# Patient Record
Sex: FEMALE | Race: WHITE | Employment: UNEMPLOYED | URBAN - METROPOLITAN AREA
[De-identification: names, ages, dates, MRNs, and addresses within clinical notes are randomized per-mention and may not be internally consistent; named-entity substitution may affect disease eponyms.]

---

## 2018-04-05 ENCOUNTER — TELEPHONE (OUTPATIENT)
Dept: OBGYN CLINIC | Facility: CLINIC | Age: 33
End: 2018-04-05

## 2018-04-05 NOTE — TELEPHONE ENCOUNTER
Unable to leave message(busy signal) if pt calls back please schedule a [facility] ED VISIT, also please get ins info. If any questions please tranf call to RN.

## 2018-04-05 NOTE — TELEPHONE ENCOUNTER
Pt was advised [facility] has reviewed her records for transfer and we will be able to accept her for care. [facility] Education appt  scheduled. Pt agrees with plan. Call transferred to [facility] to complete registration.

## 2018-04-11 ENCOUNTER — NURSE ONLY (OUTPATIENT)
Dept: OBGYN CLINIC | Facility: CLINIC | Age: 33
End: 2018-04-11

## 2018-04-11 VITALS — HEIGHT: 65.5 IN | WEIGHT: 156.38 LBS | BODY MASS INDEX: 25.74 KG/M2

## 2018-04-11 DIAGNOSIS — Z34.81 PRENATAL CARE, SUBSEQUENT PREGNANCY IN FIRST TRIMESTER: Primary | ICD-10-CM

## 2018-04-11 PROBLEM — R82.71 GROUP B STREPTOCOCCAL BACTERIURIA: Status: ACTIVE | Noted: 2018-04-11

## 2018-04-11 PROCEDURE — 99211 OFF/OP EST MAY X REQ PHY/QHP: CPT | Performed by: ADVANCED PRACTICE MIDWIFE

## 2018-04-11 RX ORDER — MULTIVIT-MIN/IRON/FOLIC ACID/K 18-600-40
2000 CAPSULE ORAL
COMMUNITY
End: 2019-01-31 | Stop reason: ALTCHOICE

## 2018-04-11 RX ORDER — CHOLECALCIFEROL (VITAMIN D3) 25 MCG
1 TABLET,CHEWABLE ORAL DAILY
COMMUNITY
End: 2019-01-31 | Stop reason: ALTCHOICE

## 2018-04-11 NOTE — PROGRESS NOTES
Nurse education complete & information given to pt. Pt has 2 cats at home (does not clean litterbox). Lab work from previous provider reviewed, entered & verified. Toxo titers & HCV ordered. Unsure if desires FTS. Will speak w/ .  Order placed & phone

## 2018-04-23 ENCOUNTER — INITIAL PRENATAL (OUTPATIENT)
Dept: OBGYN CLINIC | Facility: CLINIC | Age: 33
End: 2018-04-23

## 2018-04-23 VITALS
WEIGHT: 155 LBS | DIASTOLIC BLOOD PRESSURE: 74 MMHG | BODY MASS INDEX: 25.52 KG/M2 | HEIGHT: 65.5 IN | HEART RATE: 71 BPM | SYSTOLIC BLOOD PRESSURE: 119 MMHG

## 2018-04-23 DIAGNOSIS — Z34.81 PRENATAL CARE, SUBSEQUENT PREGNANCY, FIRST TRIMESTER: Primary | ICD-10-CM

## 2018-04-23 PROCEDURE — 81002 URINALYSIS NONAUTO W/O SCOPE: CPT | Performed by: ADVANCED PRACTICE MIDWIFE

## 2018-04-23 PROCEDURE — 99212 OFFICE O/P EST SF 10 MIN: CPT | Performed by: ADVANCED PRACTICE MIDWIFE

## 2018-04-23 NOTE — PROGRESS NOTES
Patient early ANAM.  2  with epidural, desires NCB and water birth. Normal PE today. Denies complaints. Reviewed warning signs and CNM contact info.

## 2018-05-21 ENCOUNTER — ROUTINE PRENATAL (OUTPATIENT)
Dept: OBGYN CLINIC | Facility: CLINIC | Age: 33
End: 2018-05-21

## 2018-05-21 VITALS
BODY MASS INDEX: 26 KG/M2 | WEIGHT: 159 LBS | SYSTOLIC BLOOD PRESSURE: 103 MMHG | DIASTOLIC BLOOD PRESSURE: 69 MMHG | HEART RATE: 78 BPM

## 2018-05-21 DIAGNOSIS — Z34.82 ENCOUNTER FOR SUPERVISION OF OTHER NORMAL PREGNANCY IN SECOND TRIMESTER: Primary | ICD-10-CM

## 2018-05-21 PROCEDURE — 99212 OFFICE O/P EST SF 10 MIN: CPT | Performed by: ADVANCED PRACTICE MIDWIFE

## 2018-05-21 PROCEDURE — 81002 URINALYSIS NONAUTO W/O SCOPE: CPT | Performed by: ADVANCED PRACTICE MIDWIFE

## 2018-05-21 NOTE — PROGRESS NOTES
Denies any complaints. Feeling well. +FM. Labs reviewed with pt. Pt to start probiotics.   All questions answered  Warning signs reviewed

## 2018-06-18 ENCOUNTER — ROUTINE PRENATAL (OUTPATIENT)
Dept: OBGYN CLINIC | Facility: CLINIC | Age: 33
End: 2018-06-18

## 2018-06-18 VITALS
HEART RATE: 68 BPM | SYSTOLIC BLOOD PRESSURE: 119 MMHG | DIASTOLIC BLOOD PRESSURE: 71 MMHG | WEIGHT: 160.81 LBS | BODY MASS INDEX: 26 KG/M2

## 2018-06-18 DIAGNOSIS — Z34.82 ENCOUNTER FOR SUPERVISION OF OTHER NORMAL PREGNANCY IN SECOND TRIMESTER: Primary | ICD-10-CM

## 2018-06-18 PROBLEM — Z87.59 HISTORY OF DELIVERY OF MACROSOMAL INFANT: Status: ACTIVE | Noted: 2018-06-18

## 2018-06-18 PROCEDURE — 99212 OFFICE O/P EST SF 10 MIN: CPT | Performed by: ADVANCED PRACTICE MIDWIFE

## 2018-06-18 PROCEDURE — 81002 URINALYSIS NONAUTO W/O SCOPE: CPT | Performed by: ADVANCED PRACTICE MIDWIFE

## 2018-06-18 NOTE — PROGRESS NOTES
Feeling well overall, reports some pelvic discomfort when lifting left leg that has improved with stretching and prenatal yoga. Reviewed other comfort tips. Active baby, denies cramping or bleeding. Level I u/s ordered with [facility].  Hx 9 lb infant, measurin

## 2018-06-21 ENCOUNTER — HOSPITAL ENCOUNTER (OUTPATIENT)
Dept: PERINATAL CARE | Facility: HOSPITAL | Age: 33
Discharge: HOME | End: 2018-06-21
Attending: OBSTETRICS & GYNECOLOGY
Payer: COMMERCIAL

## 2018-06-21 ENCOUNTER — HOSPITAL ENCOUNTER (OUTPATIENT)
Dept: PERINATAL CARE | Facility: HOSPITAL | Age: 33
Discharge: HOME | End: 2018-06-21
Attending: ADVANCED PRACTICE MIDWIFE
Payer: COMMERCIAL

## 2018-06-21 DIAGNOSIS — Z36.3 ENCOUNTER FOR ANTENATAL SCREENING FOR MALFORMATION: ICD-10-CM

## 2018-06-21 DIAGNOSIS — Z36.3 ENCOUNTER FOR ANTENATAL SCREENING FOR MALFORMATION: Primary | ICD-10-CM

## 2018-06-21 PROCEDURE — 76805 OB US >/= 14 WKS SNGL FETUS: CPT | Performed by: OBSTETRICS & GYNECOLOGY

## 2018-06-21 NOTE — PROGRESS NOTES
OB ULTRASOUND REPORT    Encounter for a level I ultrasound at the request of Ms. Hopper.     See imaging tab for complete ultrasound report or in PACS    Fetal Heart Rate: Present 145 bpm  Fetal Presentation: Vertex  Amniotic fluid MVP: WNL  Cord: 3 vesse

## 2018-06-25 ENCOUNTER — TELEPHONE (OUTPATIENT)
Dept: OBGYN CLINIC | Facility: CLINIC | Age: 33
End: 2018-06-25

## 2018-06-25 NOTE — TELEPHONE ENCOUNTER
Pt was advised per [facility] that her OB US showed normal anatomy and consistent with her EDC. Midwives will further discuss the results with her at her next visit. Pt agrees with plan.

## 2020-04-02 PROBLEM — E28.2 PCOS (POLYCYSTIC OVARIAN SYNDROME): Status: ACTIVE | Noted: 2019-06-12

## 2020-04-02 PROBLEM — D25.9 UTERINE LEIOMYOMA: Status: ACTIVE | Noted: 2019-06-12

## 2020-04-02 PROBLEM — E03.8 SUBCLINICAL HYPOTHYROIDISM: Status: ACTIVE | Noted: 2020-04-02

## 2023-04-27 ENCOUNTER — OFFICE VISIT (OUTPATIENT)
Dept: OBGYN CLINIC | Facility: CLINIC | Age: 38
End: 2023-04-27

## 2023-04-27 VITALS
BODY MASS INDEX: 24.49 KG/M2 | HEIGHT: 65 IN | WEIGHT: 147 LBS | HEART RATE: 85 BPM | DIASTOLIC BLOOD PRESSURE: 74 MMHG | SYSTOLIC BLOOD PRESSURE: 113 MMHG

## 2023-04-27 DIAGNOSIS — Z12.72 SMEAR, VAGINAL, AS PART OF ROUTINE GYNECOLOGICAL EXAMINATION: ICD-10-CM

## 2023-04-27 DIAGNOSIS — Z01.419 ENCOUNTER FOR ANNUAL ROUTINE GYNECOLOGICAL EXAMINATION: Primary | ICD-10-CM

## 2023-04-27 DIAGNOSIS — Z01.419 SMEAR, VAGINAL, AS PART OF ROUTINE GYNECOLOGICAL EXAMINATION: ICD-10-CM

## 2023-04-27 DIAGNOSIS — L70.9 ACNE, UNSPECIFIED ACNE TYPE: ICD-10-CM

## 2023-04-27 DIAGNOSIS — M53.3 COCCYX PAIN: ICD-10-CM

## 2023-04-27 DIAGNOSIS — R53.83 FATIGUE, UNSPECIFIED TYPE: ICD-10-CM

## 2023-04-27 DIAGNOSIS — L68.0 HIRSUTISM: ICD-10-CM

## 2023-04-27 DIAGNOSIS — R10.2 PELVIC PAIN: ICD-10-CM

## 2023-04-27 PROBLEM — R82.71 GROUP B STREPTOCOCCAL BACTERIURIA: Status: RESOLVED | Noted: 2018-04-11 | Resolved: 2023-04-27

## 2023-04-27 PROCEDURE — 3008F BODY MASS INDEX DOCD: CPT | Performed by: ADVANCED PRACTICE MIDWIFE

## 2023-04-27 PROCEDURE — 3074F SYST BP LT 130 MM HG: CPT | Performed by: ADVANCED PRACTICE MIDWIFE

## 2023-04-27 PROCEDURE — 3078F DIAST BP <80 MM HG: CPT | Performed by: ADVANCED PRACTICE MIDWIFE

## 2023-04-27 PROCEDURE — 99385 PREV VISIT NEW AGE 18-39: CPT | Performed by: ADVANCED PRACTICE MIDWIFE

## 2023-04-28 LAB — HPV I/H RISK 1 DNA SPEC QL NAA+PROBE: NEGATIVE

## 2023-06-13 ENCOUNTER — HOSPITAL ENCOUNTER (OUTPATIENT)
Dept: ULTRASOUND IMAGING | Facility: HOSPITAL | Age: 38
Discharge: HOME | End: 2023-06-13
Attending: ADVANCED PRACTICE MIDWIFE
Payer: COMMERCIAL

## 2023-06-13 DIAGNOSIS — R10.2 PELVIC PAIN: ICD-10-CM

## 2023-06-13 PROCEDURE — 76830 TRANSVAGINAL US NON-OB: CPT | Performed by: ADVANCED PRACTICE MIDWIFE

## 2023-06-13 PROCEDURE — 93975 VASCULAR STUDY: CPT | Performed by: ADVANCED PRACTICE MIDWIFE

## 2023-06-13 PROCEDURE — 76856 US EXAM PELVIC COMPLETE: CPT | Performed by: ADVANCED PRACTICE MIDWIFE

## 2023-06-19 ENCOUNTER — TELEPHONE (OUTPATIENT)
Dept: OBGYN CLINIC | Facility: CLINIC | Age: 38
End: 2023-06-19

## 2023-06-19 NOTE — TELEPHONE ENCOUNTER
Phone call to patient to review ultrasound results. Informed her that IUD is not positioned correctly. Would recommend removal and reinsertion. Instructed her to schedule and take 600mg ibuprofen 30 minutes prior to visit. She reports that she is also seeing a counselor through [facility] and is very happy with them. She has been working on self esteem and one of the things that really affects her is her acne and facial hair growth. She plans to complete testosterone testing to see if PCOS is causing these symptoms. Discussed options for management, including birth control pills or seeing a derm for further management. She will complete testing and schedule with derm.     Answered all patient questions

## 2023-06-28 ENCOUNTER — TELEPHONE (OUTPATIENT)
Dept: OBGYN CLINIC | Facility: CLINIC | Age: 38
End: 2023-06-28

## 2023-06-29 ENCOUNTER — OFFICE VISIT (OUTPATIENT)
Dept: OBGYN CLINIC | Facility: CLINIC | Age: 38
End: 2023-06-29

## 2023-06-29 VITALS
SYSTOLIC BLOOD PRESSURE: 92 MMHG | HEART RATE: 68 BPM | WEIGHT: 144 LBS | HEIGHT: 65 IN | DIASTOLIC BLOOD PRESSURE: 58 MMHG | BODY MASS INDEX: 23.99 KG/M2

## 2023-06-29 DIAGNOSIS — Z30.433 ENCOUNTER FOR IUD REMOVAL AND REINSERTION: ICD-10-CM

## 2023-06-29 DIAGNOSIS — Z11.3 SCREEN FOR STD (SEXUALLY TRANSMITTED DISEASE): ICD-10-CM

## 2023-06-29 DIAGNOSIS — Z32.00 PREGNANCY EXAMINATION OR TEST, PREGNANCY UNCONFIRMED: Primary | ICD-10-CM

## 2023-06-29 LAB
CONTROL LINE PRESENT WITH A CLEAR BACKGROUND (YES/NO): YES YES/NO
PREGNANCY TEST, URINE: NEGATIVE

## 2023-06-29 PROCEDURE — 3008F BODY MASS INDEX DOCD: CPT | Performed by: ADVANCED PRACTICE MIDWIFE

## 2023-06-29 PROCEDURE — 81025 URINE PREGNANCY TEST: CPT | Performed by: ADVANCED PRACTICE MIDWIFE

## 2023-06-29 PROCEDURE — 3074F SYST BP LT 130 MM HG: CPT | Performed by: ADVANCED PRACTICE MIDWIFE

## 2023-06-29 PROCEDURE — 58300 INSERT INTRAUTERINE DEVICE: CPT | Performed by: ADVANCED PRACTICE MIDWIFE

## 2023-06-29 PROCEDURE — 58301 REMOVE INTRAUTERINE DEVICE: CPT | Performed by: ADVANCED PRACTICE MIDWIFE

## 2023-06-29 PROCEDURE — 3078F DIAST BP <80 MM HG: CPT | Performed by: ADVANCED PRACTICE MIDWIFE

## 2023-06-29 RX ORDER — ACETAMINOPHEN 325 MG/1
650 TABLET ORAL EVERY 6 HOURS PRN
COMMUNITY

## 2023-06-30 LAB
C TRACH DNA SPEC QL NAA+PROBE: NEGATIVE
N GONORRHOEA DNA SPEC QL NAA+PROBE: NEGATIVE

## 2023-07-24 ENCOUNTER — OFFICE VISIT (OUTPATIENT)
Dept: OBGYN CLINIC | Facility: CLINIC | Age: 38
End: 2023-07-24

## 2023-07-24 ENCOUNTER — TELEPHONE (OUTPATIENT)
Dept: OBGYN CLINIC | Facility: CLINIC | Age: 38
End: 2023-07-24

## 2023-07-24 VITALS
DIASTOLIC BLOOD PRESSURE: 78 MMHG | WEIGHT: 142 LBS | SYSTOLIC BLOOD PRESSURE: 115 MMHG | HEART RATE: 74 BPM | BODY MASS INDEX: 23.66 KG/M2 | HEIGHT: 65 IN

## 2023-07-24 DIAGNOSIS — Z30.431 IUD CHECK UP: Primary | ICD-10-CM

## 2023-07-24 NOTE — PROGRESS NOTES
Subjective:   Patient ID: Jesus Manuel Collins is a 41year old female. Dipesh Mary presents for IUD check. She reports she had some bleeding for the few days after IUD insertion. Had cramping and back pain for first two weeks, now resolved. Menses started on 7/19/23 and seems to be heavier and lasting longer than a normal period. She has been anxious since IUD insertion because it was painful. She has not had intercourse since then as she has been nervous about pain. Denies vaginal discharge, vaginal irritation. History/Other:   Review of Systems   All other systems reviewed and are negative. Current Outpatient Medications   Medication Sig Dispense Refill    acetaminophen 325 MG Oral Tab Take 2 tablets (650 mg total) by mouth every 6 (six) hours as needed. (Patient not taking: Reported on 7/24/2023)      Prenatal Vit-Fe Fumarate-FA (PRENATAL 1+1 OR) Prenatal (Patient not taking: Reported on 4/27/2023)      hydrocortisone (ANUSOL-HC) 2.5 % Rectal Cream Place 1 Application rectally 2 (two) times daily as needed for Hemorrhoids. (Patient not taking: Reported on 4/27/2023) 60 g 0     Allergies:  Tamiflu                 HIVES    Objective:   Physical Exam  Vitals and nursing note reviewed. Constitutional:       General: She is not in acute distress. Appearance: Normal appearance. She is normal weight. She is not ill-appearing, toxic-appearing or diaphoretic. Pulmonary:      Effort: Pulmonary effort is normal.   Genitourinary:     General: Normal vulva. Labia:         Right: No rash, tenderness, lesion or injury. Left: No rash, tenderness, lesion or injury. Vagina: No signs of injury and foreign body. No vaginal discharge, erythema, tenderness, bleeding, lesions or prolapsed vaginal walls. Cervix: No discharge, friability, lesion, erythema, cervical bleeding or eversion.             Comments: IUD strings visible at os  Neurological:      Mental Status: She is alert and oriented to person, place, and time. Psychiatric:         Mood and Affect: Mood normal.         Behavior: Behavior normal.         Thought Content: Thought content normal.         Judgment: Judgment normal.         Assessment & Plan:   IUD check up  (primary encounter diagnosis)    No orders of the defined types were placed in this encounter. Meds This Visit:  Requested Prescriptions      No prescriptions requested or ordered in this encounter       Imaging & Referrals:  None    Reassured her that IUD seems to be in place. Reviewed warning signs and when to call.      20 minutes spent reviewing chart, counseling and examining patient, and charting

## 2023-07-24 NOTE — TELEPHONE ENCOUNTER
Pt has IUD check appt today 7/24 at 2 p.m. Pt wanted to know if she should still be seen as period is heavy. Please call.

## 2024-07-11 ENCOUNTER — OFFICE VISIT (OUTPATIENT)
Dept: OBGYN CLINIC | Facility: CLINIC | Age: 39
End: 2024-07-11
Payer: COMMERCIAL

## 2024-07-11 VITALS — DIASTOLIC BLOOD PRESSURE: 77 MMHG | BODY MASS INDEX: 24 KG/M2 | WEIGHT: 147 LBS | SYSTOLIC BLOOD PRESSURE: 114 MMHG

## 2024-07-11 DIAGNOSIS — Z01.419 ENCOUNTER FOR ANNUAL ROUTINE GYNECOLOGICAL EXAMINATION: Primary | ICD-10-CM

## 2024-07-11 DIAGNOSIS — Z32.02 PREGNANCY EXAMINATION OR TEST, NEGATIVE RESULT: ICD-10-CM

## 2024-07-11 DIAGNOSIS — T83.32XA INTRAUTERINE CONTRACEPTIVE DEVICE THREADS LOST, INITIAL ENCOUNTER: ICD-10-CM

## 2024-07-11 DIAGNOSIS — N94.3 PMS (PREMENSTRUAL SYNDROME): ICD-10-CM

## 2024-07-11 DIAGNOSIS — L68.0 HIRSUTISM: ICD-10-CM

## 2024-07-11 LAB
CONTROL LINE PRESENT WITH A CLEAR BACKGROUND (YES/NO): YES YES/NO
KIT LOT #: NORMAL NUMERIC
PREGNANCY TEST, URINE: NEGATIVE

## 2024-07-11 PROCEDURE — 3074F SYST BP LT 130 MM HG: CPT | Performed by: ADVANCED PRACTICE MIDWIFE

## 2024-07-11 PROCEDURE — 99395 PREV VISIT EST AGE 18-39: CPT | Performed by: ADVANCED PRACTICE MIDWIFE

## 2024-07-11 PROCEDURE — 81025 URINE PREGNANCY TEST: CPT | Performed by: ADVANCED PRACTICE MIDWIFE

## 2024-07-11 PROCEDURE — 3078F DIAST BP <80 MM HG: CPT | Performed by: ADVANCED PRACTICE MIDWIFE

## 2024-07-11 RX ORDER — DROSPIRENONE AND ETHINYL ESTRADIOL 0.02-3(28)
1 KIT ORAL DAILY
Qty: 28 TABLET | Refills: 11 | Status: SHIPPED | OUTPATIENT
Start: 2024-07-11 | End: 2025-07-11

## 2024-07-11 NOTE — PROGRESS NOTES
Chief Complaint:   Chief Complaint   Patient presents with    Consult     PMS and BC        HPI:     Lori is 39 year old female, here today for annual exam  Patient's last menstrual period was 07/09/2024 (exact date).. Menses fairly regular even with Mirena IUD .  Hx Prior Abnormal Pap: No  Pap Date: 07/21/17  Pap Result Notes: NORMAL RESULTS,   Denies abnormal discharge or vaginal irritation.     She is seeing a therapist for anxiety. Working through childhood trauma. She recently stopped meds as did not think they were helping her and had side effects.    She has concern for bad PMS. Reports hot flashes, lower back pain, mood changes. These symptoms are more  intense then normal. Occurs every 5th cycle.     Wellbutrin did help with mood symptoms of PMS but did not help with other symptoms so stopped.     She also reports vaginal dryness. [place] is not comfortable at first    She continues to have concern for acne and facial hair.     Current Partners: sexually with , mutually. Feels safe in relationship  Birth Control Method: IUD, happy with method, denies side effects  H/O of STI's: no  Last STI screen: unsure  Declines this testing today  Additional information:      Last Pap: 5/21/23      H/O abnormal Pap: HPV years ago      Last mammogram (if applicable): n/a    Denies history of migraine with aura and personal/family history of blood clots/stroke.    HISTORY:  Past Medical History:    Decorative tattoo    Dysmenorrhea    History of chicken pox    at age 8    Human papilloma virus infection    Migraine    Rash of unknown cause    possibly eczema per dermatologist, only during 1st trimester of each pregnancy    Subclinical hypothyroidism    Viral infection characterized by skin and mucous membrane lesions    eczema w/ pregnancy      Past Surgical History:   Procedure Laterality Date    Appendectomy      1995    Appendectomy      Colposcopy, cervix w/upper adjacent vagina; w/biopsy(s), cervix       2012      Family History   Problem Relation Age of Onset    Other (Other) Father         hyperthyroid    Other (Other) Mother         hypothyroid    Psychiatric Maternal Grandmother         dementia      Social History:   Social History     Socioeconomic History    Marital status:      Spouse name: Fabio Preciado    Number of children: 2    Years of education: 12   Occupational History    Occupation: homemaker   Tobacco Use    Smoking status: Former     Current packs/day: 0.00     Average packs/day: 0.2 packs/day for 12.0 years (2.4 ttl pk-yrs)     Types: Cigarettes     Start date: 2005     Quit date: 2017     Years since quittin.9     Passive exposure: Never    Smokeless tobacco: Former    Tobacco comments:     quit w/ pregnancies, vaping too   Vaping Use    Vaping status: Some Days   Substance and Sexual Activity    Alcohol use: Yes     Comment: rarely    Drug use: No   Other Topics Concern     Service No    Caffeine Concern No    Special Diet No    Exercise Yes     Comment: walking & yoga    Seat Belt Yes        Medications (Active prior to today's visit):  Current Outpatient Medications   Medication Sig Dispense Refill    Drospirenone-Ethinyl Estradiol (PAUL) 3-0.02 MG Oral Tab Take 1 tablet by mouth daily. 28 tablet 11       Allergies:  Allergies   Allergen Reactions    Tamiflu HIVES       HISTORY:  Past Medical History:    Decorative tattoo    Dysmenorrhea    History of chicken pox    at age 8    Human papilloma virus infection    Migraine    Rash of unknown cause    possibly eczema per dermatologist, only during 1st trimester of each pregnancy    Subclinical hypothyroidism    Viral infection characterized by skin and mucous membrane lesions    eczema w/ pregnancy      Past Surgical History:   Procedure Laterality Date    Appendectomy          Appendectomy      Colposcopy, cervix w/upper adjacent vagina; w/biopsy(s), cervix      2012      Family History   Problem Relation Age of  Onset    Other (Other) Father         hyperthyroid    Other (Other) Mother         hypothyroid    Psychiatric Maternal Grandmother         dementia      Social History:   Social History     Socioeconomic History    Marital status:      Spouse name: Fabio Preciado    Number of children: 2    Years of education: 12   Occupational History    Occupation: homemaker   Tobacco Use    Smoking status: Former     Current packs/day: 0.00     Average packs/day: 0.2 packs/day for 12.0 years (2.4 ttl pk-yrs)     Types: Cigarettes     Start date: 2005     Quit date: 2017     Years since quittin.9     Passive exposure: Never    Smokeless tobacco: Former    Tobacco comments:     quit w/ pregnancies, vaping too   Vaping Use    Vaping status: Some Days   Substance and Sexual Activity    Alcohol use: Yes     Comment: rarely    Drug use: No   Other Topics Concern     Service No    Caffeine Concern No    Special Diet No    Exercise Yes     Comment: walking & yoga    Seat Belt Yes        Medications (Active prior to today's visit):  Current Outpatient Medications   Medication Sig Dispense Refill    Drospirenone-Ethinyl Estradiol (PAUL) 3-0.02 MG Oral Tab Take 1 tablet by mouth daily. 28 tablet 11       Allergies:  Allergies   Allergen Reactions    Tamiflu HIVES          ROS:     Cardiovascular:  Negative forirregular heartbeat/palpitations. Negative for chest pain  Constitutional:  Negative for decreased activity, fever, irritability and lethargy  Gastrointestinal:  Negative for abdominal pain, constipation, decreased appetite, diarrhea and vomiting  Genitourinary:  Negative for dysuria and hematuria  Reproductive: Negative for vaginal discharge, itching, abnormal bleeding  Hema/Lymph:  Negative for easy bleeding and easy bruising  Neurological:  Negative for gait disturbance or dizziness  Psychiatric:  Negative for inappropriate interaction and psychiatric symptoms  Respiratory:  Negative for cough, dyspnea and  wheezing      PHYSICAL EXAM:   Constitutional: appears well hydrated alert and responsive no acute distress noted  Neck/Thyroid: neck is supple without adenopathy No thyromegaly  Lymphatic: no abnormal cervical, supraclavicular or axillary adenopathy is noted  Breast: normal on inspection without masses  Respiratory:  normal respiratory effort  Cardiovascular: normal pulse  Abdomen: soft non-tender non-distended no organomegaly noted no masses  Genitourinary: normal female genitalia            Vulva/Labia: Appear normal, no lesions           Vagina: Normal, no discharge           Cervix: No CMT, no mucopurulent discharge.  IUD strings not visualized at cervical os           Uterus: non-tender, no masses, not enlarged            Adnexa: not enlarged, Right tenderness on palpation. Left nontender  Musculoskeletal: Normal strength, no swelling  Integumentary: Warm, Dry, appropriate color, Intact  Neurologic: Alert, Oriented  Psychiatric: Cooperative, appropriate mood and affect            ASSESSMENT/PLAN:   Assessment   Encounter Diagnoses   Name Primary?    Intrauterine contraceptive device threads lost, initial encounter     Pregnancy examination or test, negative result     Encounter for annual routine gynecological examination Yes    Hirsutism     PMS (premenstrual syndrome)        Normal gyne exam. Pap deferred -pap up to date  STD testing- declined  Diet/excercise discussed  Discussed breast self awareness  Ultrasound ordered due to missing IUD strings. Recommend condoms until know if IUD is in place  Discussed options of managing PMS and hirsutism, including regular exercise, vitex, OCPs. Patient would like to try birth control pills with drosperinone to see if this helps with these symptoms. Reviewed risks and side effects, including ACHES.         Orders This Visit:  Orders Placed This Encounter   Procedures    POC Urine pregnancy test [46095]       Meds This Visit:  Requested Prescriptions     Signed  Prescriptions Disp Refills    Drospirenone-Ethinyl Estradiol (PAUL) 3-0.02 MG Oral Tab 28 tablet 11     Sig: Take 1 tablet by mouth daily.       Imaging & Referrals:  US PELVIS (TRANSABDOMINAL AND TRANSVAGINAL) (CPT=76856/52200)     7/11/2024  Lyudmila Chakraborty CNM

## 2024-07-25 ENCOUNTER — OFFICE VISIT (OUTPATIENT)
Dept: OBGYN CLINIC | Facility: CLINIC | Age: 39
End: 2024-07-25
Payer: COMMERCIAL

## 2024-07-25 VITALS
HEART RATE: 73 BPM | SYSTOLIC BLOOD PRESSURE: 111 MMHG | WEIGHT: 142 LBS | BODY MASS INDEX: 24 KG/M2 | DIASTOLIC BLOOD PRESSURE: 72 MMHG

## 2024-07-25 DIAGNOSIS — Z30.432 ENCOUNTER FOR IUD REMOVAL: Primary | ICD-10-CM

## 2024-07-25 DIAGNOSIS — R14.0 ABDOMINAL BLOATING: ICD-10-CM

## 2024-07-25 DIAGNOSIS — Z30.011 BCP (BIRTH CONTROL PILLS) INITIATION: ICD-10-CM

## 2024-07-25 PROCEDURE — 58301 REMOVE INTRAUTERINE DEVICE: CPT | Performed by: ADVANCED PRACTICE MIDWIFE

## 2024-07-25 PROCEDURE — 99212 OFFICE O/P EST SF 10 MIN: CPT | Performed by: ADVANCED PRACTICE MIDWIFE

## 2024-07-25 PROCEDURE — 3074F SYST BP LT 130 MM HG: CPT | Performed by: ADVANCED PRACTICE MIDWIFE

## 2024-07-25 PROCEDURE — 3078F DIAST BP <80 MM HG: CPT | Performed by: ADVANCED PRACTICE MIDWIFE

## 2024-07-25 NOTE — PROGRESS NOTES
Subjective:   Patient ID: Lori Preciado is a 39 year old female.    Lori presents for IUD check. IUD strings not visible on 7/11/24. She would like this re-checked and if able would like to remove IUD. She reports bloating since IUD was inserted on 6/29/23. She would like to switch to birth control pills.     Denies history of migraine with aura and personal or family history of blood clots or stroke.     She is sexually active with  in mutually monogamous relationship.        History/Other:   Review of Systems   All other systems reviewed and are negative.    Current Outpatient Medications   Medication Sig Dispense Refill    Drospirenone-Ethinyl Estradiol (PAUL) 3-0.02 MG Oral Tab Take 1 tablet by mouth daily. (Patient not taking: Reported on 7/25/2024) 28 tablet 11     Allergies:  Allergies   Allergen Reactions    Tamiflu HIVES       Objective:   Physical Exam  Vitals and nursing note reviewed.   Constitutional:       General: She is not in acute distress.     Appearance: Normal appearance. She is normal weight. She is not ill-appearing, toxic-appearing or diaphoretic.   Pulmonary:      Effort: Pulmonary effort is normal.   Genitourinary:     General: Normal vulva.      Labia:         Right: No rash, tenderness, lesion or injury.         Left: No rash, tenderness, lesion or injury.       Vagina: No signs of injury and foreign body. No vaginal discharge, erythema, tenderness, bleeding, lesions or prolapsed vaginal walls.      Cervix: No discharge, friability, lesion, erythema, cervical bleeding or eversion.   Neurological:      Mental Status: She is alert and oriented to person, place, and time.   Psychiatric:         Mood and Affect: Mood normal.         Behavior: Behavior normal.         Thought Content: Thought content normal.         Judgment: Judgment normal.         Assessment & Plan:   1. Encounter for IUD removal    2. Abdominal bloating    3. BCP (birth control pills) initiation        Orders Placed  This Encounter   Procedures    REMOVE INTRAUTERINE DEVICE [62142]       Meds This Visit:  Requested Prescriptions      No prescriptions requested or ordered in this encounter       Imaging & Referrals:  None    Discussed importance of using condoms for first week after starting birth control pills. Reviewed warning signs, including ACHES.    If bloating unresolved, will plan for ultrasound.    See procedure note for IUD removal    15 minutes spent counseling patient separate from IUD removal.

## 2024-07-25 NOTE — PROCEDURES
IUD Removal     Birth control method(s) used: Mirena  Consent signed.  Procedure discussed with the patient in detail including indication, risks, benefits, alternatives and complications.    Pelvic Exam Findings:  Pelvic exam WNL    Procedure:  Speculum placed in the vagina.  Betadine wash of vagina and cervix.  IUD strings not visible  Forcep used to grasp IUD strings just inside cervical os.  Mirena IUD was removed without difficulty.  The patient tolerated the procedure well.      Visit Plan:  Discharge instructions were reviewed with the patient.

## (undated) NOTE — LETTER
AUTHORIZATION FOR SURGICAL OPERATION OR OTHER PROCEDURE    1. I hereby authorize SHANIKA Chakraborty , and [facility] staff assigned to my case to perform the following operation and/or procedure at the [facility] site:    _____________IUD Removal _______________________________________________________    2.  My physician has explained the nature and purpose of the operation or other procedure, possible alternative methods of treatment, the risks involved, and the possibility of complication to me.  I acknowledge that no guarantee has been made as to the result that may be obtained.  3.  I recognize that, during the course of this operation, or other procedure, unforseen conditions may necessitate additional or different procedure than those listed above.  I, therefore, further authorize and request that the above named physician, his/her physician assistants or designees perform such procedures as are, in his/her professional opinion, necessary and desirable.  4.  Any tissue or organs removed in the operation or other procedure may be disposed of by and at the discretion of the [facility] and [facility].  5.  I understand that in the event of a medical emergency, I will be transported by local paramedics to [facility] or other hospital emergency department.  6.  I certify that I have read and fully understand the above consent to operation and/or other procedure.    7.  I acknowledge that my physician has explained sedation/analgesia administration to me including the risks and benefits.  I consent to the administration of sedation/analgesia as may be necessary or desirable in the judgement of my physician.    Witness signature: ___________________________________________________ Date:  ______/______/_____                    Time:  ________ A.M.  P.M.       Patient Name:  ______________________________________________________  (please print)      Patient  signature:  ___________________________________________________             Relationship to Patient:           []  Parent    Responsible person                          []  Spouse  In case of minor or                    [] Other  _____________   Incompetent name:  __________________________________________________                               (please print)      _____________      Responsible person  In case of minor or  Incompetent signature:  _______________________________________________    Statement of Physician  My signature below affirms that prior to the time of the procedure, I have explained to the patient and/or his/her guardian, the risks and benefits involved in the proposed treatment and any reasonable alternative to the proposed treatment.  I have also explained the risks and benefits involved in the refusal of the proposed treatment and have answered the patient's questions.                        Date:  ______/______/_______  Provider                      Signature:  __________________________________________________________       Time:  ___________ A.M    P.M.